# Patient Record
Sex: MALE | Race: WHITE | Employment: PART TIME | URBAN - METROPOLITAN AREA
[De-identification: names, ages, dates, MRNs, and addresses within clinical notes are randomized per-mention and may not be internally consistent; named-entity substitution may affect disease eponyms.]

---

## 2018-12-29 ENCOUNTER — TELEPHONE (OUTPATIENT)
Dept: OTHER | Age: 23
End: 2018-12-29

## 2018-12-29 NOTE — TELEPHONE ENCOUNTER
Niraj Mace enrolled as a member of the [facility]. This encounter is for documentation purposes only. No provider action is required.      [address]